# Patient Record
Sex: FEMALE | Race: BLACK OR AFRICAN AMERICAN | NOT HISPANIC OR LATINO | Employment: FULL TIME | URBAN - METROPOLITAN AREA
[De-identification: names, ages, dates, MRNs, and addresses within clinical notes are randomized per-mention and may not be internally consistent; named-entity substitution may affect disease eponyms.]

---

## 2019-07-23 ENCOUNTER — OFFICE VISIT (OUTPATIENT)
Dept: OTOLARYNGOLOGY | Facility: CLINIC | Age: 34
End: 2019-07-23
Payer: COMMERCIAL

## 2019-07-23 VITALS
HEART RATE: 78 BPM | DIASTOLIC BLOOD PRESSURE: 71 MMHG | SYSTOLIC BLOOD PRESSURE: 110 MMHG | TEMPERATURE: 99 F | WEIGHT: 173.75 LBS

## 2019-07-23 DIAGNOSIS — J30.89 NON-SEASONAL ALLERGIC RHINITIS, UNSPECIFIED TRIGGER: Primary | ICD-10-CM

## 2019-07-23 PROCEDURE — 99204 PR OFFICE/OUTPT VISIT, NEW, LEVL IV, 45-59 MIN: ICD-10-PCS | Mod: S$GLB,,, | Performed by: PHYSICIAN ASSISTANT

## 2019-07-23 PROCEDURE — 99999 PR PBB SHADOW E&M-NEW PATIENT-LVL III: CPT | Mod: PBBFAC,,, | Performed by: PHYSICIAN ASSISTANT

## 2019-07-23 PROCEDURE — 99999 PR PBB SHADOW E&M-NEW PATIENT-LVL III: ICD-10-PCS | Mod: PBBFAC,,, | Performed by: PHYSICIAN ASSISTANT

## 2019-07-23 PROCEDURE — 99204 OFFICE O/P NEW MOD 45 MIN: CPT | Mod: S$GLB,,, | Performed by: PHYSICIAN ASSISTANT

## 2019-07-23 RX ORDER — MONTELUKAST SODIUM 10 MG/1
10 TABLET ORAL EVERY MORNING
Qty: 30 TABLET | Refills: 2 | Status: SHIPPED | OUTPATIENT
Start: 2019-07-23 | End: 2019-08-22

## 2019-07-23 RX ORDER — METHYLPREDNISOLONE 4 MG/1
TABLET ORAL
Qty: 1 PACKAGE | Refills: 0 | Status: SHIPPED | OUTPATIENT
Start: 2019-07-23 | End: 2019-08-13

## 2019-07-23 RX ORDER — FLUTICASONE PROPIONATE 50 MCG
2 SPRAY, SUSPENSION (ML) NASAL 2 TIMES DAILY
Qty: 9.9 ML | Refills: 11 | Status: SHIPPED | OUTPATIENT
Start: 2019-07-23 | End: 2023-01-20

## 2019-07-23 RX ORDER — LEVOCETIRIZINE DIHYDROCHLORIDE 5 MG/1
5 TABLET, FILM COATED ORAL NIGHTLY
Qty: 30 TABLET | Refills: 11 | Status: SHIPPED | OUTPATIENT
Start: 2019-07-23 | End: 2023-01-20

## 2019-07-23 NOTE — PROGRESS NOTES
Referring Provider:    Argentinarefbecky Self  No address on file  Subjective:   Patient: Pasha Barnes [id number], :1985   Visit date:2019 2:04 PM    Chief Complaint:  Allergies (chronic)    HPI:  Pasha is a 34 y.o. female who I was asked to see in consultation for evaluation of the following issue(s):    The patient reports the following allergy/sinus symptoms (Negative unless checked off):   [x] Sneezing   [] Nasal congestion  [x] Itchy nose **main complaint  []  Rhinorrhea  [] Watery eyes  [] Itchy eyes  [] Cough  [] Post-nasal drip  [] Ear pressure  [] Ear popping   [] Otorrhea  [x] Facial pressure  [] Asthma  [] Eczema  [] Other    Medications:  []Anti-histamines:   [] Allegra   [] Benadryl   [x] Claritin   [] Xyzal   [x] Zyrtec   [] other  [] Singulair  [] Nasal Sprays:   [] Astelin   [] Atrovent   [] Flonase   [] Nasacort   [] Nasonex   [] Other  [] SCIT  [] SLIT   [] Oral steroid  [] IM steroid  [] Antibiotics:   [] Amoxicillin   [] Augmentin   [] Azithromycin   [] Bactrim   [] Cefdinir   [] Clindamycin   [] Doxycycline   [] Levaquin   [] Other  [] Other    Severity: moderate    Allergy testing for this patient was not done.    Current smoker:  No    There have been no recent imaging study of the sinuses (none).  No results found for this or any previous visit.      Review of Systems:  Negative unless checked off.  Gen:  []fever   []fatigue  HENT:  []nosebleeds  []dental problem   Eyes:  []photophobia  []visual disturbance  Resp:  []chest tightness []wheezing  Card:  []chest pain  []leg swelling  GI:  []abdominal pain []blood in stool  :  []dysuria  []hematuria  Musc:  []joint swelling  []gait problem  Skin:  []color change  []pallor  Neuro:  []seizures  []numbness  Hem:  []bruise/bleed easily  Psych:  []hallucinations  []behavioral problems  Allergy/Imm: has No Known Allergies.    Her meds, allergies, medical, surgical, social & family histories were reviewed & updated:  -     She has a  current medication list which includes the following prescription(s): fluticasone propionate, levocetirizine, methylprednisolone, and montelukast.  -     She  has no past medical history on file.   -     She does not have a problem list on file.   -     She  has no past surgical history on file.  -     She  reports that she has never smoked. She has never used smokeless tobacco.  -     Her family history is not on file.  -     She has No Known Allergies.    Objective:     Physical Exam:  Vitals:  /71   Pulse 78   Temp 99.1 °F (37.3 °C)   Wt 78.8 kg (173 lb 11.6 oz)   General appearance:  Well developed, well nourished    Eyes:  Extraocular motions intact, PERRL    Communication:  no hoarseness, no dysphonia    Ears:  Otoscopy of external auditory canals and tympanic membranes was normal, clinical speech reception thresholds grossly intact, no mass/lesion of auricle.  Nose:  Bilateral inf turbs are very boggy, moderate hypertrophy. Clear mucus. No mass/lesions.   Mouth:  No mass/lesion of lips, teeth, gums, hard/soft palate, tongue, tonsils, or oropharynx.    Cardiovascular:  No pedal edema; Radial Pulses +2     Neck & Lymphatics:  No cervical lymphadenopathy, no neck mass/crepitus/ asymmetry, trachea is midline, no thyroid enlargement/tenderness/mass.    Psych: Oriented x3,  Alert with normal mood and affect.     Respiration/Chest:  Symmetric expansion during respiration, normal respiratory effort.    Skin:  Warm and intact. No ulcerations of face, scalp, neck.    Assessment & Plan:   Lastacia was seen today for allergies.    Diagnoses and all orders for this visit:    Non-seasonal allergic rhinitis, unspecified trigger    Other orders  -     methylPREDNISolone (MEDROL DOSEPACK) 4 mg tablet; use as directed  -     fluticasone propionate (FLONASE) 50 mcg/actuation nasal spray; 2 sprays (100 mcg total) by Each Nare route 2 (two) times daily.  -     levocetirizine (XYZAL) 5 MG tablet; Take 1 tablet (5 mg  total) by mouth every evening.  -     montelukast (SINGULAIR) 10 mg tablet; Take 1 tablet (10 mg total) by mouth every morning.    -SINUSITIS/RHINITIS  Lastacia presents today for initial evaluation.  They have multiple sinonasal complaints and determination of the underlying etiology is a problem of moderate to high complexity.  Patients may present with sinonasal symptoms such as nasal obstruction as a primary anatomic disorder.  Patients may also present with recurrent or chronic inflammatory sinonasal symptoms.  Generally, patients can be stratified into one of several groups.      The first group represents patients who have frequent or recurrent upper respiratory infections, most frequently viral.  These patients most frequently have normally functioning immune system's but have high levels of exposure.  This is commonly seen in nurses and schoolteachers as well as other groups who spend large amounts of time around sick patients and children.      Other patients may have isolated rhinitis without evidence of sinusitis.  The majority of these patients have ALLERGIC rhinitis however other groups may have more rare forms of rhinitis such as nonallergic rhinitis with eosinophilia syndrome.  As a screening evaluation, if the patient has had a normal endoscopy, from time to time a simple x-ray of the sinuses may be performed in combination with antigen specific ALLERGY testing.    The third group of patients present with significant evidence of chronic sinusitis.  Nasal endoscopy may reveal polyps or purulent drainage.  CT scan is an important aspect to determining the extent of disease.  Some patients with chronic sinusitis have an underlying etiology of ALLERGY leading to obstruction of the ostiomeatal units with furthering of the infection.  Others may have an acute infection that leads to stenosis of the sinonasal openings followed by a long, progressive course of infection.  Patients with unilateral disease  who do not have inverting papilloma typically fall into this latter group.    CT scan of the sinuses has not been performed.      Antigen specific allergy testing  has not been performed.    Allergy treatment with topical steroids and/or antihistamines has not been used with good compliance with symptoms unchanged.      By review of all data, history and exam, there does seem to be a clear distinction between allergic rhinitis versus rhinitis with a component of chronic sinusitis.   My impression is that Pasha presents with non-seasonal AR.      My recommendation is:    Trial with Singulair QAM/ Xyzal QPM  Flonase BID  Medrol for acute inflammation  Recheck in 1 mo, consider allergy testing    We discussed her medical conditions, treatments and plan.  Pasha should return to clinic if any issues arise (symptoms worsen or persist), otherwise we will see her back in the clinic In 1 months.    Over 25 minutes were spent in face to face contact with the patient with greater than 50% spent discussing their diagnosis and coordination of care.     Thank you for allowing me to participate in the care of Pasha.      Tracy Darden PA-C  [facility]   [facility]  [address]  [place] [address]  P: [phone]  F: [phone]

## 2023-07-31 ENCOUNTER — PATIENT MESSAGE (OUTPATIENT)
Dept: RESEARCH | Facility: HOSPITAL | Age: 38
End: 2023-07-31
Payer: COMMERCIAL